# Patient Record
Sex: MALE | Race: WHITE | NOT HISPANIC OR LATINO | Employment: FULL TIME | URBAN - METROPOLITAN AREA
[De-identification: names, ages, dates, MRNs, and addresses within clinical notes are randomized per-mention and may not be internally consistent; named-entity substitution may affect disease eponyms.]

---

## 2023-04-12 ENCOUNTER — HOSPITAL ENCOUNTER (OUTPATIENT)
Dept: DATA CONVERSION | Facility: HOSPITAL | Age: 43
End: 2023-04-12
Attending: INTERNAL MEDICINE | Admitting: INTERNAL MEDICINE
Payer: COMMERCIAL

## 2023-04-12 DIAGNOSIS — Q39.8 OTHER CONGENITAL MALFORMATIONS OF ESOPHAGUS: ICD-10-CM

## 2023-04-12 DIAGNOSIS — R09.89 OTHER SPECIFIED SYMPTOMS AND SIGNS INVOLVING THE CIRCULATORY AND RESPIRATORY SYSTEMS: ICD-10-CM

## 2023-04-12 DIAGNOSIS — K22.89 OTHER SPECIFIED DISEASE OF ESOPHAGUS: ICD-10-CM

## 2023-04-12 DIAGNOSIS — K25.9 GASTRIC ULCER, UNSPECIFIED AS ACUTE OR CHRONIC, WITHOUT HEMORRHAGE OR PERFORATION: ICD-10-CM

## 2023-04-12 DIAGNOSIS — M06.9 RHEUMATOID ARTHRITIS, UNSPECIFIED (MULTI): ICD-10-CM

## 2023-04-12 DIAGNOSIS — Z87.891 PERSONAL HISTORY OF NICOTINE DEPENDENCE: ICD-10-CM

## 2023-04-12 DIAGNOSIS — F41.9 ANXIETY DISORDER, UNSPECIFIED: ICD-10-CM

## 2023-04-12 DIAGNOSIS — K21.9 GASTRO-ESOPHAGEAL REFLUX DISEASE WITHOUT ESOPHAGITIS: ICD-10-CM

## 2023-04-12 DIAGNOSIS — R12 HEARTBURN: ICD-10-CM

## 2023-04-12 DIAGNOSIS — K31.89 OTHER DISEASES OF STOMACH AND DUODENUM: ICD-10-CM

## 2023-04-17 LAB
COMPLETE PATHOLOGY REPORT: NORMAL
CONVERTED CLINICAL DIAGNOSIS-HISTORY: NORMAL
CONVERTED FINAL DIAGNOSIS: NORMAL
CONVERTED FINAL REPORT PDF LINK TO COPY AND PASTE: NORMAL
CONVERTED GROSS DESCRIPTION: NORMAL

## 2023-07-10 ENCOUNTER — OFFICE VISIT (OUTPATIENT)
Dept: PRIMARY CARE | Facility: CLINIC | Age: 43
End: 2023-07-10
Payer: COMMERCIAL

## 2023-07-10 ENCOUNTER — LAB (OUTPATIENT)
Dept: LAB | Facility: LAB | Age: 43
End: 2023-07-10
Payer: COMMERCIAL

## 2023-07-10 VITALS
DIASTOLIC BLOOD PRESSURE: 78 MMHG | SYSTOLIC BLOOD PRESSURE: 120 MMHG | WEIGHT: 219 LBS | HEART RATE: 70 BPM | BODY MASS INDEX: 31.42 KG/M2

## 2023-07-10 DIAGNOSIS — Z11.4 ENCOUNTER FOR SCREENING FOR HIV: ICD-10-CM

## 2023-07-10 DIAGNOSIS — Z11.59 NEED FOR HEPATITIS C SCREENING TEST: ICD-10-CM

## 2023-07-10 DIAGNOSIS — T78.40XA ALLERGIC REACTION, INITIAL ENCOUNTER: Primary | ICD-10-CM

## 2023-07-10 DIAGNOSIS — Z00.00 ANNUAL PHYSICAL EXAM: ICD-10-CM

## 2023-07-10 DIAGNOSIS — Z12.5 PROSTATE CANCER SCREENING: ICD-10-CM

## 2023-07-10 PROBLEM — M05.9 RHEUMATOID ARTHRITIS WITH RHEUMATOID FACTOR (MULTI): Status: ACTIVE | Noted: 2023-07-10

## 2023-07-10 PROBLEM — F41.9 ANXIETY: Status: ACTIVE | Noted: 2023-07-10

## 2023-07-10 PROBLEM — K21.9 ESOPHAGEAL REFLUX: Status: ACTIVE | Noted: 2023-07-10

## 2023-07-10 PROBLEM — R09.A2 GLOBUS SENSATION: Status: ACTIVE | Noted: 2023-07-10

## 2023-07-10 PROBLEM — E03.9 ACQUIRED HYPOTHYROIDISM: Status: ACTIVE | Noted: 2023-07-10

## 2023-07-10 PROBLEM — M25.50 ARTHRALGIA OF MULTIPLE SITES: Status: ACTIVE | Noted: 2023-07-10

## 2023-07-10 LAB
ALANINE AMINOTRANSFERASE (SGPT) (U/L) IN SER/PLAS: 31 U/L (ref 10–52)
ALBUMIN (G/DL) IN SER/PLAS: 4.3 G/DL (ref 3.4–5)
ALKALINE PHOSPHATASE (U/L) IN SER/PLAS: 42 U/L (ref 33–120)
ANION GAP IN SER/PLAS: 12 MMOL/L (ref 10–20)
ASPARTATE AMINOTRANSFERASE (SGOT) (U/L) IN SER/PLAS: 20 U/L (ref 9–39)
BASOPHILS (10*3/UL) IN BLOOD BY AUTOMATED COUNT: 0.03 X10E9/L (ref 0–0.1)
BASOPHILS/100 LEUKOCYTES IN BLOOD BY AUTOMATED COUNT: 0.5 % (ref 0–2)
BILIRUBIN TOTAL (MG/DL) IN SER/PLAS: 0.4 MG/DL (ref 0–1.2)
C REACTIVE PROTEIN (MG/L) IN SER/PLAS: 0.57 MG/DL
CALCIUM (MG/DL) IN SER/PLAS: 8.8 MG/DL (ref 8.6–10.3)
CARBON DIOXIDE, TOTAL (MMOL/L) IN SER/PLAS: 26 MMOL/L (ref 21–32)
CHLORIDE (MMOL/L) IN SER/PLAS: 104 MMOL/L (ref 98–107)
CHOLESTEROL (MG/DL) IN SER/PLAS: 182 MG/DL (ref 0–199)
CHOLESTEROL IN HDL (MG/DL) IN SER/PLAS: 53.4 MG/DL
CHOLESTEROL/HDL RATIO: 3.4
CREATININE (MG/DL) IN SER/PLAS: 0.9 MG/DL (ref 0.5–1.3)
EOSINOPHILS (10*3/UL) IN BLOOD BY AUTOMATED COUNT: 0.09 X10E9/L (ref 0–0.7)
EOSINOPHILS/100 LEUKOCYTES IN BLOOD BY AUTOMATED COUNT: 1.5 % (ref 0–6)
ERYTHROCYTE DISTRIBUTION WIDTH (RATIO) BY AUTOMATED COUNT: 12.9 % (ref 11.5–14.5)
ERYTHROCYTE MEAN CORPUSCULAR HEMOGLOBIN CONCENTRATION (G/DL) BY AUTOMATED: 32.2 G/DL (ref 32–36)
ERYTHROCYTE MEAN CORPUSCULAR VOLUME (FL) BY AUTOMATED COUNT: 98 FL (ref 80–100)
ERYTHROCYTES (10*6/UL) IN BLOOD BY AUTOMATED COUNT: 4.59 X10E12/L (ref 4.5–5.9)
GFR MALE: >90 ML/MIN/1.73M2
GLUCOSE (MG/DL) IN SER/PLAS: 84 MG/DL (ref 74–99)
HEMATOCRIT (%) IN BLOOD BY AUTOMATED COUNT: 45 % (ref 41–52)
HEMOGLOBIN (G/DL) IN BLOOD: 14.5 G/DL (ref 13.5–17.5)
IMMATURE GRANULOCYTES/100 LEUKOCYTES IN BLOOD BY AUTOMATED COUNT: 0.2 % (ref 0–0.9)
LDL: 102 MG/DL (ref 0–99)
LEUKOCYTES (10*3/UL) IN BLOOD BY AUTOMATED COUNT: 6.2 X10E9/L (ref 4.4–11.3)
LYMPHOCYTES (10*3/UL) IN BLOOD BY AUTOMATED COUNT: 1.84 X10E9/L (ref 1.2–4.8)
LYMPHOCYTES/100 LEUKOCYTES IN BLOOD BY AUTOMATED COUNT: 29.7 % (ref 13–44)
MONOCYTES (10*3/UL) IN BLOOD BY AUTOMATED COUNT: 0.79 X10E9/L (ref 0.1–1)
MONOCYTES/100 LEUKOCYTES IN BLOOD BY AUTOMATED COUNT: 12.7 % (ref 2–10)
NEUTROPHILS (10*3/UL) IN BLOOD BY AUTOMATED COUNT: 3.44 X10E9/L (ref 1.2–7.7)
NEUTROPHILS/100 LEUKOCYTES IN BLOOD BY AUTOMATED COUNT: 55.4 % (ref 40–80)
PLATELETS (10*3/UL) IN BLOOD AUTOMATED COUNT: 194 X10E9/L (ref 150–450)
POTASSIUM (MMOL/L) IN SER/PLAS: 3.9 MMOL/L (ref 3.5–5.3)
PROSTATE SPECIFIC AG (NG/ML) IN SER/PLAS: 0.19 NG/ML (ref 0–4)
PROTEIN TOTAL: 7.3 G/DL (ref 6.4–8.2)
SEDIMENTATION RATE, ERYTHROCYTE: 8 MM/H (ref 0–15)
SODIUM (MMOL/L) IN SER/PLAS: 138 MMOL/L (ref 136–145)
THYROTROPIN (MIU/L) IN SER/PLAS BY DETECTION LIMIT <= 0.05 MIU/L: 2.46 MIU/L (ref 0.44–3.98)
TRIGLYCERIDE (MG/DL) IN SER/PLAS: 132 MG/DL (ref 0–149)
UREA NITROGEN (MG/DL) IN SER/PLAS: 14 MG/DL (ref 6–23)
VLDL: 26 MG/DL (ref 0–40)

## 2023-07-10 PROCEDURE — 80053 COMPREHEN METABOLIC PANEL: CPT

## 2023-07-10 PROCEDURE — 99214 OFFICE O/P EST MOD 30 MIN: CPT | Performed by: FAMILY MEDICINE

## 2023-07-10 PROCEDURE — 85025 COMPLETE CBC W/AUTO DIFF WBC: CPT

## 2023-07-10 PROCEDURE — 86803 HEPATITIS C AB TEST: CPT

## 2023-07-10 PROCEDURE — 80061 LIPID PANEL: CPT

## 2023-07-10 PROCEDURE — 1036F TOBACCO NON-USER: CPT | Performed by: FAMILY MEDICINE

## 2023-07-10 PROCEDURE — 84443 ASSAY THYROID STIM HORMONE: CPT

## 2023-07-10 PROCEDURE — 36415 COLL VENOUS BLD VENIPUNCTURE: CPT

## 2023-07-10 PROCEDURE — 87389 HIV-1 AG W/HIV-1&-2 AB AG IA: CPT

## 2023-07-10 PROCEDURE — 84153 ASSAY OF PSA TOTAL: CPT

## 2023-07-10 RX ORDER — METHOTREXATE 2.5 MG/1
TABLET ORAL
COMMUNITY
End: 2023-10-20 | Stop reason: SDUPTHER

## 2023-07-10 RX ORDER — EPINEPHRINE 0.3 MG/.3ML
1 INJECTION SUBCUTANEOUS AS NEEDED
Qty: 1 EACH | Refills: 1 | Status: SHIPPED | OUTPATIENT
Start: 2023-07-10 | End: 2024-07-09

## 2023-07-10 RX ORDER — FOLIC ACID 1 MG/1
1 TABLET ORAL DAILY
COMMUNITY
End: 2023-10-25 | Stop reason: SDUPTHER

## 2023-07-10 RX ORDER — METHYLPREDNISOLONE 4 MG/1
TABLET ORAL
Qty: 21 TABLET | Refills: 0 | Status: SHIPPED | OUTPATIENT
Start: 2023-07-10 | End: 2023-07-17

## 2023-07-10 RX ORDER — OMEPRAZOLE 20 MG/1
1 CAPSULE, DELAYED RELEASE ORAL DAILY
COMMUNITY
End: 2023-10-25 | Stop reason: DRUGHIGH

## 2023-07-10 RX ORDER — ADALIMUMAB 40MG/0.4ML
KIT SUBCUTANEOUS
COMMUNITY
Start: 2023-04-19 | End: 2023-10-25 | Stop reason: WASHOUT

## 2023-07-10 RX ORDER — FLUOXETINE HYDROCHLORIDE 20 MG/1
1 CAPSULE ORAL DAILY
COMMUNITY
Start: 2020-11-16 | End: 2023-10-25 | Stop reason: WASHOUT

## 2023-07-10 RX ORDER — HYDROXYCHLOROQUINE SULFATE 200 MG/1
1 TABLET, FILM COATED ORAL
COMMUNITY
Start: 2017-03-17 | End: 2023-12-04

## 2023-07-10 ASSESSMENT — PATIENT HEALTH QUESTIONNAIRE - PHQ9
1. LITTLE INTEREST OR PLEASURE IN DOING THINGS: NOT AT ALL
2. FEELING DOWN, DEPRESSED OR HOPELESS: NOT AT ALL
SUM OF ALL RESPONSES TO PHQ9 QUESTIONS 1 AND 2: 0

## 2023-07-10 NOTE — ASSESSMENT & PLAN NOTE
Common side effects of Humira include skin rashes and the symptoms do correspond chronologically with the initiation of this medication  Before discontinuing this medication, I recommend that you follow-up with your rheumatologist first  You indicated you have an appointment with him in 2 weeks but you may want to call him sooner  I gave you the number for Dr. Carrasco, an allergy and immunologist, for formal allergy testing, please call and schedule an appointment with him at your earliest convenience  I have also ordered health maintenance labs as it has been a while since we last did this  Additionally, I sent prescriptions to your pharmacy for Medrol Dosepak and an EpiPen to have around just in case anaphylaxis occurs  If at anytime you begin to experience difficulty breathing, please call 911 or go to the nearest emergency department

## 2023-07-10 NOTE — PROGRESS NOTES
Subjective   Patient ID: Rhys Paul is a 43 y.o. male who presents for Allergic Reaction (Lips and tongue swell on and off).    HPI  2-month history of intermittent swelling of lips and tongue with rashes developing on his arms and legs  The swelling happened suddenly and will usually self resolve in a day or so  His tongue has gotten so swollen that has interfered with his speech but never to the point where he had difficulty breathing  The rashes start small and eventually get to the size of about a softball  Eventually they self resolve also but are warm and very itchy  Denies constitutional symptoms  Reports that his rheumatologist started him on Humira sometime in early June for his RA and this corresponds with onset of his symptoms    Review of Systems  All pertinent positive symptoms are included in the history of present illness.    All other systems have been reviewed and are negative and noncontributory to this patient's current ailments.    Current Outpatient Medications   Medication Instructions    EPINEPHrine (EPIPEN) 0.3 mg, injection, As needed, Call 911 after use.    FLUoxetine (PROzac) 20 mg capsule 1 capsule, oral, Daily    folic acid (Folvite) 1 mg tablet 1 tablet, oral, Daily    Humira,CF, Pen 40 mg/0.4 mL pen injector kit pen-injector subcutaneous    hydroxychloroquine (Plaquenil) 200 mg tablet 1 tablet, oral, 2 times daily with meals    methotrexate (Trexall) 2.5 mg tablet oral    methylPREDNISolone (Medrol Dospak) 4 mg tablets Take as directed on package.    omeprazole (PriLOSEC) 20 mg DR capsule 1 capsule, oral, Daily     No Known Allergies    Immunization History   Administered Date(s) Administered    Hep A, Adult 01/28/2016    Influenza, Unspecified 10/17/2014    Pfizer Gray Cap SARS-CoV-2 06/16/2022    Pfizer Purple Cap SARS-CoV-2 02/13/2021, 03/07/2021, 10/09/2021    Pfizer Sars-cov-2 Bivalent 30 mcg/0.3 mL 03/04/2023    Tdap 11/24/2021     Past Surgical History:   Procedure  Laterality Date    GALLBLADDER SURGERY  11/11/2013    Gallbladder Surgery    OTHER SURGICAL HISTORY  11/11/2013    Knee Arthroscopy With Medial And Lateral Meniscus Repair    TYMPANOSTOMY TUBE PLACEMENT  11/11/2013    Ear Pressure Equalization Tube, Insertion, Bilaterally     No family history on file.  Social History     Tobacco Use    Smoking status: Former     Types: Cigarettes    Smokeless tobacco: Never       Objective   Visit Vitals  /78   Pulse 70   Wt 99.3 kg (219 lb)   BMI 31.42 kg/m²   Smoking Status Former   BSA 2.21 m²       Physical Exam  CONSTITUTIONAL - well nourished, well developed, looks like stated age, in no acute distress, not ill-appearing, and not tired appearing  SKIN - multiple circumferential pink-colored lesions involving both arms and posterior thighs, lesions are slightly raised and warm to touch but do not appear erythematous or infectious  HEAD - no trauma, normocephalic  CHEST - clear to auscultation, no wheezing, no crackles and no rales, good effort  CARDIAC - regular rate and regular rhythm, no skipped beats, no murmur  EXTREMITIES - no edema, no deformities  NEUROLOGICAL - normal gait, normal balance, normal motor  PSYCHIATRIC - alert, pleasant and cordial, age-appropriate     Assessment/Plan   Problem List Items Addressed This Visit       Allergic reaction - Primary     Common side effects of Humira include skin rashes and the symptoms do correspond chronologically with the initiation of this medication  Before discontinuing this medication, I recommend that you follow-up with your rheumatologist first  You indicated you have an appointment with him in 2 weeks but you may want to call him sooner  I gave you the number for Dr. Carrasco, an allergy and immunologist, for formal allergy testing, please call and schedule an appointment with him at your earliest convenience  I have also ordered health maintenance labs as it has been a while since we last did this  Additionally,  I sent prescriptions to your pharmacy for Medrol Dosepak and an EpiPen to have around just in case anaphylaxis occurs  If at anytime you begin to experience difficulty breathing, please call 911 or go to the nearest emergency department         Relevant Medications    methylPREDNISolone (Medrol Dospak) 4 mg tablets    EPINEPHrine (Epipen) 0.3 mg/0.3 mL injection syringe     Other Visit Diagnoses       Annual physical exam        Relevant Orders    Lipid Panel    Prostate Specific Antigen    TSH with reflex to Free T4 if abnormal    Comprehensive Metabolic Panel    CBC and Auto Differential    HIV 1/2 Antigen/Antibody Screen with Reflex to Confirmation    Hepatitis C Antibody    Need for hepatitis C screening test        Relevant Orders    Hepatitis C Antibody    Prostate cancer screening        Relevant Orders    Prostate Specific Antigen    Encounter for screening for HIV        Relevant Orders    HIV 1/2 Antigen/Antibody Screen with Reflex to Confirmation

## 2023-07-11 LAB
HEPATITIS C VIRUS AB PRESENCE IN SERUM: NONREACTIVE
HIV 1/ 2 AG/AB SCREEN: NONREACTIVE

## 2023-07-13 LAB
NIL(NEG) CONTROL SPOT COUNT: NORMAL
PANEL A SPOT COUNT: 1
PANEL B SPOT COUNT: 0
POS CONTROL SPOT COUNT: NORMAL
T-SPOT. TB INTERPRETATION: NEGATIVE

## 2023-09-20 PROBLEM — R76.8 RHEUMATOID FACTOR POSITIVE: Status: ACTIVE | Noted: 2023-09-20

## 2023-09-20 RX ORDER — CETIRIZINE HYDROCHLORIDE 10 MG/1
10 TABLET ORAL
COMMUNITY
Start: 2017-04-28 | End: 2024-02-28 | Stop reason: ALTCHOICE

## 2023-09-20 RX ORDER — LORATADINE 10 MG/1
10 TABLET ORAL DAILY
COMMUNITY
End: 2024-02-28 | Stop reason: ALTCHOICE

## 2023-09-20 RX ORDER — IBUPROFEN 600 MG/1
TABLET ORAL
COMMUNITY
End: 2024-02-28 | Stop reason: ALTCHOICE

## 2023-09-20 RX ORDER — ESCITALOPRAM OXALATE 20 MG/1
20 TABLET ORAL DAILY
COMMUNITY
End: 2023-10-25 | Stop reason: WASHOUT

## 2023-09-20 RX ORDER — ALBUTEROL SULFATE 90 UG/1
2 AEROSOL, METERED RESPIRATORY (INHALATION) EVERY 4 HOURS PRN
COMMUNITY
Start: 2018-05-02 | End: 2023-10-25 | Stop reason: WASHOUT

## 2023-09-20 RX ORDER — BENZONATATE 100 MG/1
200 CAPSULE ORAL EVERY 8 HOURS PRN
COMMUNITY
Start: 2018-05-02 | End: 2023-10-25 | Stop reason: WASHOUT

## 2023-09-20 RX ORDER — OMEPRAZOLE 40 MG/1
40 CAPSULE, DELAYED RELEASE ORAL
COMMUNITY
End: 2023-10-25 | Stop reason: WASHOUT

## 2023-09-20 RX ORDER — TRIAMCINOLONE ACETONIDE 1 MG/G
1 CREAM TOPICAL 2 TIMES DAILY
COMMUNITY
Start: 2017-04-28 | End: 2023-10-25 | Stop reason: WASHOUT

## 2023-10-20 ENCOUNTER — OFFICE VISIT (OUTPATIENT)
Dept: RHEUMATOLOGY | Facility: CLINIC | Age: 43
End: 2023-10-20
Payer: COMMERCIAL

## 2023-10-20 VITALS — DIASTOLIC BLOOD PRESSURE: 80 MMHG | WEIGHT: 223 LBS | BODY MASS INDEX: 32 KG/M2 | SYSTOLIC BLOOD PRESSURE: 124 MMHG

## 2023-10-20 DIAGNOSIS — M05.79 RHEUMATOID ARTHRITIS INVOLVING MULTIPLE SITES WITH POSITIVE RHEUMATOID FACTOR (MULTI): Primary | ICD-10-CM

## 2023-10-20 PROCEDURE — 99213 OFFICE O/P EST LOW 20 MIN: CPT | Performed by: INTERNAL MEDICINE

## 2023-10-20 PROCEDURE — 1036F TOBACCO NON-USER: CPT | Performed by: INTERNAL MEDICINE

## 2023-10-20 RX ORDER — METHOTREXATE 2.5 MG/1
20 TABLET ORAL
Qty: 96 TABLET | Refills: 0 | Status: SHIPPED
Start: 2023-10-20 | End: 2024-02-26 | Stop reason: SDUPTHER

## 2023-10-20 NOTE — PROGRESS NOTES
"Subjective   Patient ID: Rhys Paul is a 43 y.o. male who presents for Follow-up.    HPI  43-year-old male with history of seropositive RA presented for follow-up.  Patient states he is doing well.  No joint pain, swelling or morning stiffness.  He states hives are better with antihistamines.  He is following with allergy and immunology.  No formal diagnosis was made however it was thought he may have idiopathic urticaria.    Last Humira injection was given in July.    Immunosuppression: HCQ/MTX.    Past immunosuppression:  1.  Sulfasalazine.  Mood changes.  2.  Humira.      Review of Systems   All other systems reviewed and are negative.    Objective .      2/9/2022     8:23 AM 12/21/2022     1:50 PM 2/6/2023     4:04 PM 4/19/2023     4:26 PM 7/10/2023    11:29 AM 7/19/2023     8:00 AM 10/20/2023     8:00 AM   Vitals   Systolic  122 132 121 120 123 124   Diastolic  81 86 84 78 83 80   Heart Rate   60  70     Temp   36.5 °C (97.7 °F)       Height (in)  1.778 m (5' 10\") 1.778 m (5' 10\") 1.778 m (5' 10\")  1.778 m (5' 10\")    Weight (lb) 206 206 211 214 219 216 223   BMI 29.56 kg/m2 29.56 kg/m2 30.28 kg/m2 30.71 kg/m2 31.42 kg/m2 30.99 kg/m2 32 kg/m2   BSA (m2) 2.15 m2 2.15 m2 2.17 m2 2.19 m2 2.21 m2 2.2 m2 2.23 m2   Visit Report     Report  Report      Physical Exam.  Gen. AAO x3, NAD.  HEENT: No pallor or icterus, PERRLA, EOMI. Parotid glands  not enlarged. No cervical lymphadenopathy .  Skin: No rashes.  Heart: S1, S2/ RRR.   Lungs: CTA B.  Abdomen: Soft, NT/ND, BS regular.  MSK: No swollen or tender joint.  Neuro: Sensation to touch intact.Strength 5/5 throughout.   Psych:Appropriate mood and behavior  EXT: No edema    Assessment/Plan     43-year-old male with history of seropositive RA presented for follow-up.    #1: Seropositive RA.  He is doing well on current regimen.  It is unlikely he has had hives secondary to Humira.    -Continue methotrexate and hydroxychloroquine.  -Labs.  -We may consider to resume " Humira if needed.    Follow-up in 3 months.     This note was partially generated using the Dragon Voice recognition system. There may be some incorrect wording, spelling and/or spelling errors or punctuation errors that were not corrected prior to committing the note to the medical record.    Patient Active Problem List   Diagnosis    Acquired hypothyroidism    Anxiety    Arthralgia of multiple sites    Esophageal reflux    Rheumatoid arthritis with rheumatoid factor (CMS/HCC)    Globus sensation    Allergic reaction    Rheumatoid factor positive      Social History     Tobacco Use    Smoking status: Former     Types: Cigarettes    Smokeless tobacco: Never   Substance Use Topics    Alcohol use: Not on file      Family History   Problem Relation Name Age of Onset    Rheum arthritis Brother        Past Surgical History:   Procedure Laterality Date    GALLBLADDER SURGERY  11/11/2013    Gallbladder Surgery    OTHER SURGICAL HISTORY  11/11/2013    Knee Arthroscopy With Medial And Lateral Meniscus Repair    TYMPANOSTOMY TUBE PLACEMENT  11/11/2013    Ear Pressure Equalization Tube, Insertion, Bilaterally      No Known Allergies   Current Outpatient Medications   Medication Instructions    albuterol 90 mcg/actuation inhaler 2 puffs, inhalation, Every 4 hours PRN    benzonatate (TESSALON) 200 mg, oral, Every 8 hours PRN    cetirizine (ZYRTEC) 10 mg, oral, Daily RT    EPINEPHrine (EPIPEN) 0.3 mg, injection, As needed, Call 911 after use.    escitalopram (LEXAPRO) 20 mg, oral, Daily    FLUoxetine (PROzac) 20 mg capsule 1 capsule, oral, Daily    folic acid (Folvite) 1 mg tablet 1 tablet, oral, Daily    Humira,CF, Pen 40 mg/0.4 mL pen injector kit pen-injector subcutaneous    hydroxychloroquine (Plaquenil) 200 mg tablet 1 tablet, oral, 2 times daily with meals    ibuprofen 600 mg tablet     loratadine (CLARITIN) 10 mg, oral, Daily, In the morning    methotrexate (TREXALL) 20 mg, oral, Weekly    multivitamin capsule multivitamin     omeprazole (PriLOSEC) 20 mg DR capsule 1 capsule, oral, Daily    omeprazole (PRILOSEC) 40 mg, oral, Daily RT    triamcinolone (Kenalog) 0.1 % cream 1 Application, Topical, 2 times daily

## 2023-10-25 ENCOUNTER — OFFICE VISIT (OUTPATIENT)
Dept: PRIMARY CARE | Facility: CLINIC | Age: 43
End: 2023-10-25
Payer: COMMERCIAL

## 2023-10-25 VITALS
DIASTOLIC BLOOD PRESSURE: 82 MMHG | HEART RATE: 70 BPM | BODY MASS INDEX: 32.88 KG/M2 | HEIGHT: 69 IN | SYSTOLIC BLOOD PRESSURE: 122 MMHG | WEIGHT: 222 LBS

## 2023-10-25 DIAGNOSIS — Z00.00 PHYSICAL EXAM, ANNUAL: Primary | ICD-10-CM

## 2023-10-25 DIAGNOSIS — L50.9 URTICARIA: ICD-10-CM

## 2023-10-25 DIAGNOSIS — M05.9 RHEUMATOID ARTHRITIS WITH POSITIVE RHEUMATOID FACTOR, INVOLVING UNSPECIFIED SITE (MULTI): ICD-10-CM

## 2023-10-25 PROBLEM — R09.A2 GLOBUS SENSATION: Status: RESOLVED | Noted: 2023-07-10 | Resolved: 2023-10-25

## 2023-10-25 PROBLEM — R76.8 RHEUMATOID FACTOR POSITIVE: Status: RESOLVED | Noted: 2023-09-20 | Resolved: 2023-10-25

## 2023-10-25 PROCEDURE — 99213 OFFICE O/P EST LOW 20 MIN: CPT | Performed by: FAMILY MEDICINE

## 2023-10-25 PROCEDURE — 1036F TOBACCO NON-USER: CPT | Performed by: FAMILY MEDICINE

## 2023-10-25 PROCEDURE — 93000 ELECTROCARDIOGRAM COMPLETE: CPT | Performed by: FAMILY MEDICINE

## 2023-10-25 PROCEDURE — 99396 PREV VISIT EST AGE 40-64: CPT | Performed by: FAMILY MEDICINE

## 2023-10-25 RX ORDER — FAMOTIDINE 20 MG/1
20 TABLET, FILM COATED ORAL 2 TIMES DAILY
COMMUNITY
End: 2024-02-28 | Stop reason: ALTCHOICE

## 2023-10-25 RX ORDER — OMEPRAZOLE 20 MG/1
20 CAPSULE, DELAYED RELEASE ORAL
Qty: 1 CAPSULE | Refills: 0 | Status: SHIPPED | OUTPATIENT
Start: 2023-10-25

## 2023-10-25 RX ORDER — FOLIC ACID 1 MG/1
1 TABLET ORAL DAILY
Qty: 90 TABLET | Refills: 3 | Status: SHIPPED | OUTPATIENT
Start: 2023-10-25 | End: 2024-02-28 | Stop reason: SDUPTHER

## 2023-10-25 RX ORDER — METHYLPREDNISOLONE 4 MG/1
TABLET ORAL
Qty: 21 TABLET | Refills: 0 | Status: SHIPPED | OUTPATIENT
Start: 2023-10-25

## 2023-10-25 ASSESSMENT — PATIENT HEALTH QUESTIONNAIRE - PHQ9
1. LITTLE INTEREST OR PLEASURE IN DOING THINGS: NOT AT ALL
SUM OF ALL RESPONSES TO PHQ9 QUESTIONS 1 AND 2: 0
2. FEELING DOWN, DEPRESSED OR HOPELESS: NOT AT ALL

## 2023-10-25 NOTE — PROGRESS NOTES
Subjective   Patient ID: Mohit Paul is a 43 y.o. male who presents for Annual Exam and Allergies.    HPI  1.  Physical exam.  Immunizations: Tdap 2021, flu vaccine updated before today's visit  Physical exam blood work done in the summer, looked excellent, those results are noted below  No significant medical concerns or complaints today with the exception of below    2.  Idiopathic urticaria.  For the past 5 months he has had hives at pressure points throughout his body  Currently they are around his wrists, forearms, and neck  States they are itchy and he has tried a multitude of anti-histamines after consulting Allergist, Dr. Carrasco, with whom he has another follow-up next week  Requesting a Medrol DosePak as this is to be the only thing that has provided relief in the past    3.  Rheumatoid arthritis.  Previously diagnosed with RA and following up with Rheumatology  Interested in considering a second opinion    Review of Systems  All pertinent positive symptoms are included in the history of present illness.    All other systems have been reviewed and are negative and noncontributory to this patient's current ailments.    Past Medical History:   Diagnosis Date    Gastro-esophageal reflux disease with esophagitis, without bleeding     Gastro-esophageal reflux disease with esophagitis    Hypothyroidism, unspecified 06/16/2017    Acquired hypothyroidism    Rheumatoid arthritis with rheumatoid factor (CMS/[facility]) 07/10/2023     Past Surgical History:   Procedure Laterality Date    GALLBLADDER SURGERY  11/11/2013    Gallbladder Surgery    KNEE ARTHROSCOPY W/ MENISCAL REPAIR Right     TYMPANOSTOMY TUBE PLACEMENT  11/11/2013    Ear Pressure Equalization Tube, Insertion, Bilaterally     Social History     Tobacco Use    Smoking status: Former     Types: Cigarettes    Smokeless tobacco: Never   Substance Use Topics    Alcohol use: Yes    Drug use: Never     Family History   Problem Relation Name Age of Onset    Other  "(Rheumatoid arthritis) Brother       No Known Allergies  Immunization History   Administered Date(s) Administered    Flu vaccine (IIV4), preservative free *Check age/dose* 09/25/2023    Hepatitis A vaccine, age 19 years and greater (HAVRIX) 01/28/2016    Influenza, Unspecified 10/04/2013, 10/03/2014, 10/17/2014, 01/28/2016, 09/02/2016, 09/15/2017, 10/01/2020, 11/07/2020, 09/30/2021, 11/19/2022, 09/22/2023    Pfizer COVID-19 vaccine, bivalent, age 12 years and older (30 mcg/0.3 mL) 03/04/2023    Pfizer Gray Cap SARS-CoV-2 06/16/2022    Pfizer Purple Cap SARS-CoV-2 02/13/2021, 03/07/2021, 10/09/2021    Tdap vaccine, age 7 year and older (BOOSTRIX) 07/07/2010, 01/28/2016, 11/24/2021     Current Outpatient Medications   Medication Instructions    cetirizine (ZYRTEC) 10 mg, oral, Daily RT    EPINEPHrine (EPIPEN) 0.3 mg, injection, As needed, Call 911 after use.    famotidine (PEPCID) 20 mg, oral, 2 times daily    folic acid (FOLVITE) 1 mg, oral, Daily    hydroxychloroquine (Plaquenil) 200 mg tablet 1 tablet, oral, 2 times daily with meals    ibuprofen 600 mg tablet     loratadine (CLARITIN) 10 mg, oral, Daily, In the morning    methotrexate (TREXALL) 20 mg, oral, Weekly    methylPREDNISolone (Medrol, Luis Alberto,) 4 mg tablets Follow schedule on package instructions    multivitamin capsule multivitamin    omeprazole (PRILOSEC) 20 mg, oral, 2 times daily before meals     Objective   Visit Vitals  /82   Pulse 70   Ht 1.753 m (5' 9\")   Wt 101 kg (222 lb)   BMI 32.78 kg/m²   Smoking Status Former   BSA 2.22 m²     No visits with results within 1 Month(s) from this visit.   Latest known visit with results is:   Lab on 07/10/2023   Component Date Value    Cholesterol 07/10/2023 182     HDL 07/10/2023 53.4     Cholesterol/HDL Ratio 07/10/2023 3.4     LDL 07/10/2023 102 (H)     VLDL 07/10/2023 26     Triglycerides 07/10/2023 132     PSA 07/10/2023 0.19     TSH 07/10/2023 2.46     Glucose 07/10/2023 84     Sodium 07/10/2023 138     " Potassium 07/10/2023 3.9     Chloride 07/10/2023 104     Bicarbonate 07/10/2023 26     Anion Gap 07/10/2023 12     Urea Nitrogen 07/10/2023 14     Creatinine 07/10/2023 0.90     GFR MALE 07/10/2023 >90     Calcium 07/10/2023 8.8     Albumin 07/10/2023 4.3     Alkaline Phosphatase 07/10/2023 42     Total Protein 07/10/2023 7.3     AST 07/10/2023 20     Total Bilirubin 07/10/2023 0.4     ALT (SGPT) 07/10/2023 31     WBC 07/10/2023 6.2     RBC 07/10/2023 4.59     Hemoglobin 07/10/2023 14.5     Hematocrit 07/10/2023 45.0     MCV 07/10/2023 98     MCHC 07/10/2023 32.2     Platelets 07/10/2023 194     RDW 07/10/2023 12.9     Neutrophils % 07/10/2023 55.4     Immature Granulocytes %,* 07/10/2023 0.2     Lymphocytes % 07/10/2023 29.7     Monocytes % 07/10/2023 12.7     Eosinophils % 07/10/2023 1.5     Basophils % 07/10/2023 0.5     Neutrophils Absolute 07/10/2023 3.44     Lymphocytes Absolute 07/10/2023 1.84     Monocytes Absolute 07/10/2023 0.79     Eosinophils Absolute 07/10/2023 0.09     Basophils Absolute 07/10/2023 0.03     HIV 1 and 2 Screen 07/10/2023 NONREACTIVE     Hepatitis C Ab 07/10/2023 NONREACTIVE      Physical Exam  CONSTITUTIONAL - well nourished, well developed, looks like stated age, in no acute distress, not ill-appearing, and not tired appearing  SKIN - normal skin color and pigmentation, urticaria bilateral wrists, forearms and neck  HEAD - no trauma, normocephalic  EYES - pupils are equal and reactive to light  ENT - TM's intact, no injection, no signs of infection, uvula midline, normal tongue movement and throat normal  NECK - supple without rigidity  CHEST - clear to auscultation, no wheezing, no crackles and no rales, good effort  CARDIAC - regular rate and regular rhythm, no skipped beats, no murmur  ABDOMEN - no organomegaly, soft, nontender, nondistended  EXTREMITIES - no obvious or evident edema, no obvious or evident deformities  NEUROLOGICAL - normal gait, normal balance, normal  motor  PSYCHIATRIC - alert, pleasant and cordial, age-appropriate  IMMUNOLOGIC - no cervical lymphadenopathy    Assessment/Plan   Problem List Items Addressed This Visit       Rheumatoid arthritis with rheumatoid factor (CMS/HCC)    Relevant Medications    folic acid (Folvite) 1 mg tablet    Physical exam, annual - Primary     Complete history and physical examination was performed  EKG reveals normal sinus rhythm without acute changes  Blood work was done earlier this summer, noted to be all normal         Relevant Orders    ECG 12 lead (Clinic Performed) (Completed)    Urticaria    Relevant Medications    methylPREDNISolone (Medrol, Luis Alberto,) 4 mg tablets

## 2023-10-25 NOTE — ASSESSMENT & PLAN NOTE
Complete history and physical examination was performed  EKG reveals normal sinus rhythm without acute changes  Blood work was done earlier this summer, noted to be all normal

## 2023-12-02 DIAGNOSIS — R76.8 RHEUMATOID FACTOR POSITIVE: Primary | ICD-10-CM

## 2023-12-04 RX ORDER — HYDROXYCHLOROQUINE SULFATE 200 MG/1
TABLET, FILM COATED ORAL
Qty: 180 TABLET | Refills: 0 | Status: SHIPPED | OUTPATIENT
Start: 2023-12-04 | End: 2024-02-28 | Stop reason: SDUPTHER

## 2024-01-23 ENCOUNTER — APPOINTMENT (OUTPATIENT)
Dept: RHEUMATOLOGY | Facility: CLINIC | Age: 44
End: 2024-01-23
Payer: COMMERCIAL

## 2024-02-26 DIAGNOSIS — M05.79 RHEUMATOID ARTHRITIS INVOLVING MULTIPLE SITES WITH POSITIVE RHEUMATOID FACTOR (MULTI): ICD-10-CM

## 2024-02-26 RX ORDER — METHOTREXATE 2.5 MG/1
20 TABLET ORAL
Qty: 16 TABLET | Refills: 0 | Status: SHIPPED | OUTPATIENT
Start: 2024-02-26 | End: 2024-02-28 | Stop reason: SDUPTHER

## 2024-02-28 ENCOUNTER — OFFICE VISIT (OUTPATIENT)
Dept: RHEUMATOLOGY | Facility: CLINIC | Age: 44
End: 2024-02-28
Payer: COMMERCIAL

## 2024-02-28 ENCOUNTER — LAB (OUTPATIENT)
Dept: LAB | Facility: LAB | Age: 44
End: 2024-02-28
Payer: COMMERCIAL

## 2024-02-28 VITALS
WEIGHT: 226 LBS | SYSTOLIC BLOOD PRESSURE: 130 MMHG | HEIGHT: 69 IN | HEART RATE: 88 BPM | BODY MASS INDEX: 33.47 KG/M2 | DIASTOLIC BLOOD PRESSURE: 81 MMHG

## 2024-02-28 DIAGNOSIS — R76.8 RHEUMATOID FACTOR POSITIVE: ICD-10-CM

## 2024-02-28 DIAGNOSIS — M05.9 RHEUMATOID ARTHRITIS WITH POSITIVE RHEUMATOID FACTOR, INVOLVING UNSPECIFIED SITE (MULTI): ICD-10-CM

## 2024-02-28 DIAGNOSIS — M05.9 RHEUMATOID ARTHRITIS WITH POSITIVE RHEUMATOID FACTOR, INVOLVING UNSPECIFIED SITE (MULTI): Primary | ICD-10-CM

## 2024-02-28 DIAGNOSIS — M05.79 RHEUMATOID ARTHRITIS INVOLVING MULTIPLE SITES WITH POSITIVE RHEUMATOID FACTOR (MULTI): ICD-10-CM

## 2024-02-28 LAB
ALBUMIN SERPL BCP-MCNC: 4.4 G/DL (ref 3.4–5)
ALP SERPL-CCNC: 46 U/L (ref 33–120)
ALT SERPL W P-5'-P-CCNC: 21 U/L (ref 10–52)
ANION GAP SERPL CALC-SCNC: 13 MMOL/L (ref 10–20)
AST SERPL W P-5'-P-CCNC: 18 U/L (ref 9–39)
BILIRUB DIRECT SERPL-MCNC: 0.1 MG/DL (ref 0–0.3)
BILIRUB SERPL-MCNC: 0.4 MG/DL (ref 0–1.2)
BUN SERPL-MCNC: 12 MG/DL (ref 6–23)
CALCIUM SERPL-MCNC: 9.3 MG/DL (ref 8.6–10.6)
CHLORIDE SERPL-SCNC: 102 MMOL/L (ref 98–107)
CO2 SERPL-SCNC: 29 MMOL/L (ref 21–32)
CREAT SERPL-MCNC: 0.87 MG/DL (ref 0.5–1.3)
CRP SERPL-MCNC: 0.34 MG/DL
EGFRCR SERPLBLD CKD-EPI 2021: >90 ML/MIN/1.73M*2
ERYTHROCYTE [DISTWIDTH] IN BLOOD BY AUTOMATED COUNT: 11.9 % (ref 11.5–14.5)
ERYTHROCYTE [SEDIMENTATION RATE] IN BLOOD BY WESTERGREN METHOD: 6 MM/H (ref 0–15)
GLUCOSE SERPL-MCNC: 98 MG/DL (ref 74–99)
HCT VFR BLD AUTO: 43.9 % (ref 41–52)
HGB BLD-MCNC: 14.5 G/DL (ref 13.5–17.5)
MCH RBC QN AUTO: 31.4 PG (ref 26–34)
MCHC RBC AUTO-ENTMCNC: 33 G/DL (ref 32–36)
MCV RBC AUTO: 95 FL (ref 80–100)
NRBC BLD-RTO: 0 /100 WBCS (ref 0–0)
PLATELET # BLD AUTO: 193 X10*3/UL (ref 150–450)
POTASSIUM SERPL-SCNC: 3.8 MMOL/L (ref 3.5–5.3)
PROT SERPL-MCNC: 7.4 G/DL (ref 6.4–8.2)
RBC # BLD AUTO: 4.62 X10*6/UL (ref 4.5–5.9)
SODIUM SERPL-SCNC: 140 MMOL/L (ref 136–145)
WBC # BLD AUTO: 7.2 X10*3/UL (ref 4.4–11.3)

## 2024-02-28 PROCEDURE — 99213 OFFICE O/P EST LOW 20 MIN: CPT | Performed by: INTERNAL MEDICINE

## 2024-02-28 PROCEDURE — 36415 COLL VENOUS BLD VENIPUNCTURE: CPT

## 2024-02-28 PROCEDURE — 86140 C-REACTIVE PROTEIN: CPT

## 2024-02-28 PROCEDURE — 85027 COMPLETE CBC AUTOMATED: CPT

## 2024-02-28 PROCEDURE — 85652 RBC SED RATE AUTOMATED: CPT

## 2024-02-28 PROCEDURE — 1036F TOBACCO NON-USER: CPT | Performed by: INTERNAL MEDICINE

## 2024-02-28 PROCEDURE — 82248 BILIRUBIN DIRECT: CPT

## 2024-02-28 PROCEDURE — 80053 COMPREHEN METABOLIC PANEL: CPT

## 2024-02-28 RX ORDER — METHOTREXATE 2.5 MG/1
20 TABLET ORAL
Qty: 96 TABLET | Refills: 1 | Status: SHIPPED | OUTPATIENT
Start: 2024-02-28 | End: 2024-05-28

## 2024-02-28 RX ORDER — METHOTREXATE 2.5 MG/1
20 TABLET ORAL
Qty: 96 TABLET | Refills: 1 | Status: SHIPPED | OUTPATIENT
Start: 2024-02-28 | End: 2024-02-28 | Stop reason: SDUPTHER

## 2024-02-28 RX ORDER — HYDROXYCHLOROQUINE SULFATE 200 MG/1
200 TABLET, FILM COATED ORAL 2 TIMES DAILY
Qty: 180 TABLET | Refills: 0 | Status: SHIPPED | OUTPATIENT
Start: 2024-02-28 | End: 2024-05-28

## 2024-02-28 RX ORDER — FOLIC ACID 1 MG/1
1 TABLET ORAL DAILY
Qty: 90 TABLET | Refills: 3 | Status: SHIPPED | OUTPATIENT
Start: 2024-02-28 | End: 2025-02-27

## 2024-02-28 NOTE — PATIENT INSTRUCTIONS
Continue methotrexate and hydroxychloroquine as prescribed.  Schedule annual eye exam.  Call me if any question.  Follow-up in 4 months.

## 2024-02-28 NOTE — PROGRESS NOTES
"Subjective . Rhys Paul \"Mohit\" is a 44 y.o. male who presents for Med Refill.    Med Refill    . 44-year-old male with history of seropositive RA presented for follow-up.     He stated that he is feeling fine most of the time however once in a while notes mild fullness in the shoulders after certain activities.  Hives improved with Xolair.    Impression:Immunosuppression: HCQ/MTX.     Past immunosuppression:  1.  Sulfasalazine.  Mood changes.  2.  Humira.    Review of Systems   All other systems reviewed and are negative.    Objective     Blood pressure 130/81, pulse 88, height 1.753 m (5' 9\"), weight 103 kg (226 lb).    Physical Exam.  Gen. AAO x3, NAD.  HEENT: No pallor or icterus, PERRLA, EOMI. Parotid glands  not enlarged. No cervical lymphadenopathy .  Skin: No rashes.  Heart: S1, S2/ RRR. No murmurs or gallops.  Lungs: CTA B.  Abdomen: Soft, NT/ND.  MSK: No.swelling or tenderness of the  upper or lower extremity joints with full ROM, Neck,spine and Yunior SI with out tenderness.  Neuro:  Sensation to touch intact.Strength 5/5 throughout.   Psych:Appropriate mood and behavior  EXT: No edema    Assessment/Plan   . 44-year-old male with history of seropositive RA presented for follow-up.     1: Seropositive RA.  He is doing well.  No swollen or tender joint.  -Continue methotrexate 20 mg/week.  -Continue hydroxychloroquine twice a day.  Annual eye exam discussed.  -Labs.    Follow-up in 4 months.     This note was partially generated using the Dragon Voice recognition system. There may be some incorrect wording, spelling and/or spelling errors or punctuation errors that were not corrected prior to committing the note to the medical record.    Problem List Items Addressed This Visit       Rheumatoid arthritis with rheumatoid factor (CMS/HCC) - Primary    Relevant Medications    methotrexate (Trexall) 2.5 mg tablet    folic acid (Folvite) 1 mg tablet    Other Relevant Orders    CBC    Comprehensive Metabolic " Panel    C-reactive protein    Sedimentation Rate     Other Visit Diagnoses       Rheumatoid factor positive        Relevant Medications    hydroxychloroquine (Plaquenil) 200 mg tablet               Active Ambulatory Problems     Diagnosis Date Noted    Acquired hypothyroidism 07/10/2023    Anxiety 07/10/2023    Arthralgia of multiple sites 07/10/2023    Esophageal reflux 07/10/2023    Rheumatoid arthritis with rheumatoid factor (CMS/HCC) 07/10/2023    Allergic reaction 07/10/2023    Physical exam, annual 10/25/2023    Urticaria 10/25/2023     Resolved Ambulatory Problems     Diagnosis Date Noted    Globus sensation 07/10/2023    Rheumatoid factor positive 09/20/2023     Past Medical History:   Diagnosis Date    Gastro-esophageal reflux disease with esophagitis, without bleeding     Hypothyroidism, unspecified 06/16/2017       Family History   Problem Relation Name Age of Onset    Other (Rheumatoid arthritis) Brother         Past Surgical History:   Procedure Laterality Date    GALLBLADDER SURGERY  11/11/2013    Gallbladder Surgery    KNEE ARTHROSCOPY W/ MENISCAL REPAIR Right     TYMPANOSTOMY TUBE PLACEMENT  11/11/2013    Ear Pressure Equalization Tube, Insertion, Bilaterally       Social History     Tobacco Use   Smoking Status Former    Types: Cigarettes   Smokeless Tobacco Never       Allergies  Patient has no known allergies.    Current Meds  Current Outpatient Medications   Medication Instructions    EPINEPHrine (EPIPEN) 0.3 mg, injection, As needed, Call 911 after use.    folic acid (FOLVITE) 1 mg, oral, Daily    hydroxychloroquine (PLAQUENIL) 200 mg, oral, 2 times daily    methotrexate (TREXALL) 20 mg, oral, Weekly    methylPREDNISolone (Medrol, Luis Alberto,) 4 mg tablets Follow schedule on package instructions    multivitamin capsule multivitamin    omalizumab (XOLAIR SUBQ) subcutaneous    omeprazole (PRILOSEC) 20 mg, oral, 2 times daily before meals        Lab Results   Component Value Date    SEDRATE 8 07/10/2023     CRP 0.57 07/10/2023        Mat Dale MD

## 2024-06-24 ENCOUNTER — APPOINTMENT (OUTPATIENT)
Dept: RHEUMATOLOGY | Facility: CLINIC | Age: 44
End: 2024-06-24
Payer: COMMERCIAL

## 2024-09-17 ENCOUNTER — OFFICE VISIT (OUTPATIENT)
Dept: RHEUMATOLOGY | Facility: CLINIC | Age: 44
End: 2024-09-17
Payer: COMMERCIAL

## 2024-09-17 ENCOUNTER — LAB (OUTPATIENT)
Dept: LAB | Facility: LAB | Age: 44
End: 2024-09-17
Payer: COMMERCIAL

## 2024-09-17 VITALS
WEIGHT: 215 LBS | DIASTOLIC BLOOD PRESSURE: 74 MMHG | SYSTOLIC BLOOD PRESSURE: 120 MMHG | OXYGEN SATURATION: 96 % | HEIGHT: 69 IN | HEART RATE: 64 BPM | BODY MASS INDEX: 31.84 KG/M2

## 2024-09-17 DIAGNOSIS — M05.9 RHEUMATOID ARTHRITIS WITH POSITIVE RHEUMATOID FACTOR, INVOLVING UNSPECIFIED SITE (MULTI): ICD-10-CM

## 2024-09-17 DIAGNOSIS — R76.8 RHEUMATOID FACTOR POSITIVE: ICD-10-CM

## 2024-09-17 DIAGNOSIS — M05.9 RHEUMATOID ARTHRITIS WITH POSITIVE RHEUMATOID FACTOR, INVOLVING UNSPECIFIED SITE (MULTI): Primary | ICD-10-CM

## 2024-09-17 DIAGNOSIS — M05.79 RHEUMATOID ARTHRITIS INVOLVING MULTIPLE SITES WITH POSITIVE RHEUMATOID FACTOR (MULTI): ICD-10-CM

## 2024-09-17 LAB
ALBUMIN SERPL BCP-MCNC: 4.3 G/DL (ref 3.4–5)
ALP SERPL-CCNC: 51 U/L (ref 33–120)
ALT SERPL W P-5'-P-CCNC: 19 U/L (ref 10–52)
ANION GAP SERPL CALC-SCNC: 12 MMOL/L (ref 10–20)
AST SERPL W P-5'-P-CCNC: 17 U/L (ref 9–39)
BILIRUB SERPL-MCNC: 0.5 MG/DL (ref 0–1.2)
BUN SERPL-MCNC: 14 MG/DL (ref 6–23)
CALCIUM SERPL-MCNC: 9.3 MG/DL (ref 8.6–10.6)
CHLORIDE SERPL-SCNC: 102 MMOL/L (ref 98–107)
CO2 SERPL-SCNC: 29 MMOL/L (ref 21–32)
CREAT SERPL-MCNC: 1.08 MG/DL (ref 0.5–1.3)
CRP SERPL-MCNC: 0.83 MG/DL
EGFRCR SERPLBLD CKD-EPI 2021: 87 ML/MIN/1.73M*2
ERYTHROCYTE [DISTWIDTH] IN BLOOD BY AUTOMATED COUNT: 12.2 % (ref 11.5–14.5)
ERYTHROCYTE [SEDIMENTATION RATE] IN BLOOD BY WESTERGREN METHOD: 8 MM/H (ref 0–15)
GLUCOSE SERPL-MCNC: 77 MG/DL (ref 74–99)
HCT VFR BLD AUTO: 47.3 % (ref 41–52)
HGB BLD-MCNC: 15.1 G/DL (ref 13.5–17.5)
MCH RBC QN AUTO: 30.3 PG (ref 26–34)
MCHC RBC AUTO-ENTMCNC: 31.9 G/DL (ref 32–36)
MCV RBC AUTO: 95 FL (ref 80–100)
NRBC BLD-RTO: 0 /100 WBCS (ref 0–0)
PLATELET # BLD AUTO: 209 X10*3/UL (ref 150–450)
POTASSIUM SERPL-SCNC: 4.2 MMOL/L (ref 3.5–5.3)
PROT SERPL-MCNC: 7.4 G/DL (ref 6.4–8.2)
RBC # BLD AUTO: 4.98 X10*6/UL (ref 4.5–5.9)
SODIUM SERPL-SCNC: 139 MMOL/L (ref 136–145)
WBC # BLD AUTO: 7.4 X10*3/UL (ref 4.4–11.3)

## 2024-09-17 PROCEDURE — 86140 C-REACTIVE PROTEIN: CPT

## 2024-09-17 PROCEDURE — 80053 COMPREHEN METABOLIC PANEL: CPT

## 2024-09-17 PROCEDURE — 86481 TB AG RESPONSE T-CELL SUSP: CPT

## 2024-09-17 PROCEDURE — 85652 RBC SED RATE AUTOMATED: CPT

## 2024-09-17 PROCEDURE — 1036F TOBACCO NON-USER: CPT | Performed by: INTERNAL MEDICINE

## 2024-09-17 PROCEDURE — 85027 COMPLETE CBC AUTOMATED: CPT

## 2024-09-17 PROCEDURE — 36415 COLL VENOUS BLD VENIPUNCTURE: CPT

## 2024-09-17 PROCEDURE — 99213 OFFICE O/P EST LOW 20 MIN: CPT | Performed by: INTERNAL MEDICINE

## 2024-09-17 PROCEDURE — 3008F BODY MASS INDEX DOCD: CPT | Performed by: INTERNAL MEDICINE

## 2024-09-17 RX ORDER — METHOTREXATE 2.5 MG/1
20 TABLET ORAL
Qty: 96 TABLET | Refills: 1 | Status: SHIPPED | OUTPATIENT
Start: 2024-09-22 | End: 2024-12-21

## 2024-09-17 RX ORDER — HYDROXYCHLOROQUINE SULFATE 200 MG/1
200 TABLET, FILM COATED ORAL 2 TIMES DAILY
Qty: 180 TABLET | Refills: 0 | Status: SHIPPED | OUTPATIENT
Start: 2024-09-17 | End: 2024-12-16

## 2024-09-17 RX ORDER — FOLIC ACID 1 MG/1
1 TABLET ORAL DAILY
Qty: 90 TABLET | Refills: 3 | Status: SHIPPED | OUTPATIENT
Start: 2024-09-17 | End: 2025-09-17

## 2024-09-17 NOTE — PROGRESS NOTES
"Subjective . Rhys Paul \"Mohit\" is a 44 y.o. male who presents for Follow-up ([facility]- Medication refills).    HPI.  44-year-old male with history of seropositive RA presented for follow-up.     He reports for the past couple months he has been having stiffness in the hands, elbows, shoulders and left knee.  He has not noticed swelling of the joints however has small Nobbs at the DIP joints of some of the fingers.  He has been taking ibuprofen.    Hives resolved with Xolair.  He is following with allergy immunology.  He was told that he has allergic contact dermatitis.    Impression:Immunosuppression: HCQ/MTX.     Past immunosuppression:  1.  Sulfasalazine.  Mood changes.  2.  Humira.    Review of Systems   All other systems reviewed and are negative.    Objective     Blood pressure 120/74, pulse 64, height 1.753 m (5' 9\"), weight 97.5 kg (215 lb), SpO2 96%.    Physical Exam.  Gen. AAO x3, NAD.  HEENT: No pallor or icterus, PERRLA, EOMI.  No cervical lymphadenopathy .  Skin: No rashes.  Heart: S1, S2/ RRR.   Lungs: CTA B.  Abdomen: Soft, NT/ND.  MSK: No.swelling or tenderness of the MCP and PIP joints.  Bilateral [place] nodes.  Bilateral wrist, elbows without swelling and tenderness.  Bilateral shoulders with good range of motion.  Neck, spine and SI joint without tenderness.  Bilateral Edwin's negative.  Bilateral knee without erythema, warmth or effusion.  Knee range of motion full.  Ankle and MTPs without swelling and tenderness.   Neuro: Sensation to touch intact.Strength 5/5 throughout.   Psych:Appropriate mood and behavior  EXT: No edema    Assessment/Plan .  44-year-old male with history of seropositive RA presented for follow-up.     #1: Seropositive rheumatoid arthritis.  -Begin Humira injection every other week.  -Continue methotrexate 20 mg/week.  -Continue hydroxychloroquine twice a day.  -Labs.    Follow-up in 3 months.     This note was partially generated using the Dragon Voice recognition system. " There may be some incorrect wording, spelling and/or spelling errors or punctuation errors that were not corrected prior to committing the note to the medical record.    Problem List Items Addressed This Visit       Rheumatoid arthritis with rheumatoid factor (Multi) - Primary    Relevant Medications    adalimumab (Humira Pen) 40 mg/0.4 mL pen injector kit pen-injector    methotrexate (Trexall) 2.5 mg tablet (Start on 9/22/2024)    folic acid (Folvite) 1 mg tablet    Other Relevant Orders    C-Reactive Protein    T-Spot TB    Sedimentation Rate    Comprehensive Metabolic Panel    CBC     Other Visit Diagnoses       Rheumatoid factor positive        Relevant Medications    hydroxychloroquine (Plaquenil) 200 mg tablet                 Active Ambulatory Problems     Diagnosis Date Noted    Acquired hypothyroidism 07/10/2023    Anxiety 07/10/2023    Arthralgia of multiple sites 07/10/2023    Esophageal reflux 07/10/2023    Rheumatoid arthritis with rheumatoid factor (Multi) 07/10/2023    Allergic reaction 07/10/2023    Physical exam, annual 10/25/2023    Urticaria 10/25/2023     Resolved Ambulatory Problems     Diagnosis Date Noted    Globus sensation 07/10/2023    Rheumatoid factor positive 09/20/2023     Past Medical History:   Diagnosis Date    Gastro-esophageal reflux disease with esophagitis, without bleeding     Hypothyroidism, unspecified 06/16/2017       Family History   Problem Relation Name Age of Onset    Other (Rheumatoid arthritis) Brother         Past Surgical History:   Procedure Laterality Date    GALLBLADDER SURGERY  11/11/2013    Gallbladder Surgery    KNEE ARTHROSCOPY W/ MENISCAL REPAIR Right     TYMPANOSTOMY TUBE PLACEMENT  11/11/2013    Ear Pressure Equalization Tube, Insertion, Bilaterally       Social History     Tobacco Use   Smoking Status Former    Types: Cigarettes   Smokeless Tobacco Never       Allergies  Patient has no known allergies.    Current Meds  Current Outpatient Medications    Medication Instructions    adalimumab (HUMIRA PEN) 40 mg, subcutaneous, Every 14 days    EPINEPHrine (EPIPEN) 0.3 mg, injection, As needed, Call 911 after use.    folic acid (FOLVITE) 1 mg, oral, Daily    hydroxychloroquine (PLAQUENIL) 200 mg, oral, 2 times daily    [START ON 9/22/2024] methotrexate (TREXALL) 20 mg, oral, Once Weekly    methylPREDNISolone (Medrol, Luis Alberto,) 4 mg tablets Follow schedule on package instructions    multivitamin capsule multivitamin    omalizumab (XOLAIR SUBQ) subcutaneous    omeprazole (PRILOSEC) 20 mg, oral, 2 times daily before meals        Lab Results   Component Value Date    SEDRATE 6 02/28/2024    CRP 0.34 02/28/2024             Mat Dale MD

## 2024-09-19 LAB
NIL(NEG) CONTROL SPOT COUNT: NORMAL
PANEL A SPOT COUNT: 0
PANEL B SPOT COUNT: 0
POS CONTROL SPOT COUNT: NORMAL
T-SPOT. TB INTERPRETATION: NEGATIVE

## 2024-09-25 ENCOUNTER — TELEPHONE (OUTPATIENT)
Dept: RHEUMATOLOGY | Facility: CLINIC | Age: 44
End: 2024-09-25

## 2024-09-25 DIAGNOSIS — L50.9 URTICARIA: ICD-10-CM

## 2024-09-25 RX ORDER — METHYLPREDNISOLONE 4 MG/1
TABLET ORAL
Qty: 21 TABLET | Refills: 0 | Status: SHIPPED | OUTPATIENT
Start: 2024-09-25

## 2024-11-03 ENCOUNTER — OFFICE VISIT (OUTPATIENT)
Dept: URGENT CARE | Age: 44
End: 2024-11-03
Payer: COMMERCIAL

## 2024-11-03 VITALS
DIASTOLIC BLOOD PRESSURE: 79 MMHG | HEART RATE: 60 BPM | HEIGHT: 69 IN | WEIGHT: 200 LBS | OXYGEN SATURATION: 97 % | SYSTOLIC BLOOD PRESSURE: 116 MMHG | BODY MASS INDEX: 29.62 KG/M2 | RESPIRATION RATE: 18 BRPM

## 2024-11-03 DIAGNOSIS — S60.551A SPLINTER OF HAND WITHOUT MAJOR OPEN WOUND OR INFECTION, RIGHT, INITIAL ENCOUNTER: Primary | ICD-10-CM

## 2024-11-03 RX ORDER — DOXYCYCLINE 100 MG/1
100 CAPSULE ORAL 2 TIMES DAILY
Qty: 14 CAPSULE | Refills: 0 | Status: SHIPPED | OUTPATIENT
Start: 2024-11-03 | End: 2024-11-10

## 2024-11-03 RX ORDER — MUPIROCIN 20 MG/G
OINTMENT TOPICAL 3 TIMES DAILY
Qty: 22 G | Refills: 0 | Status: SHIPPED | OUTPATIENT
Start: 2024-11-03 | End: 2024-11-10

## 2024-11-07 ENCOUNTER — APPOINTMENT (OUTPATIENT)
Dept: PRIMARY CARE | Facility: CLINIC | Age: 44
End: 2024-11-07
Payer: COMMERCIAL

## 2024-12-17 ENCOUNTER — APPOINTMENT (OUTPATIENT)
Dept: RHEUMATOLOGY | Facility: CLINIC | Age: 44
End: 2024-12-17
Payer: COMMERCIAL

## 2025-07-02 DIAGNOSIS — M05.9 RHEUMATOID ARTHRITIS WITH POSITIVE RHEUMATOID FACTOR, INVOLVING UNSPECIFIED SITE (MULTI): ICD-10-CM

## 2025-08-19 ASSESSMENT — RHEUMATOLOGY NEW PATIENT QUESTIONNAIRE
DEPRESSION: Y
MORNING STIFFNESS IN LOWER BACK: Y
NUMBNESS OR TINGLING IN HANDS OR FEET: Y
COUGH: N
UNUSUALLY RAPID OR SLOWED HEART RATE: N
UNEXPLAINED WEIGHT CHANGE: N
ABNORMAL URINE: N
SHORTNESS OF BREATH: N
EYE DRYNESS: N
SUN SENSITIVE (SUN ALLERGY): N
EASILY LOSING TEMPER: N
NIGHT SWEATS: N
HEADACHES: N
JOINT PAIN: Y
LOSS OF CONSCIOUSNESS: N
ANXIETY: Y
EXCESSIVE HAIR LOSS (MORE THAN YOUR NORM): N
NODULES/BUMPS: N
ANEMIA: N
PAIN OR BURNING ON URINATION: N
MUSCLE WEAKNESS: Y
DRYNESS OF MOUTH: N
DIFFICULTY STAYING ASLEEP: Y
SWOLLEN LEGS OR FEET: N
FAINTING: N
EYE REDNESS: N
JOINT SWELLING: Y
DOUBLE OR BLURRED VISION: N
BLOOD IN STOOLS: N
SKIN REDNESS: N
INCREASED SUSCEPTIBILITY TO INFECTION: N
UNUSUAL FATIGUE: N
STOMACH PAIN: N
UNEXPLAINED HEARING LOSS: N
JAUNDICE: N
EASY BRUISING: N
UNUSUAL BLEEDING: N
MORNING STIFFNESS: Y
HEARTBURN OR REFLUX: Y
NAUSEA: N
SORES IN MOUTH OR NOSE: N
FEVER: N
DIFFICULTY FALLING ASLEEP: N
HOARSE VOICE: N
MEMORY LOSS: N
LOSS OF VISION: N
COLOR CHANGES OF HANDS OR FEET IN THE COLD: N
RASH: N
HOW WOULD YOU DESCRIBE YOUR STIFFNESS ON AVERAGE: MODERATE
BEHAVIORAL CHANGES: N
SEIZURES: N
SKIN TIGHTNESS: N
EYE PAIN: N
DIFFICULTY BREATHING LYING DOWN: N
VOMITING OF BLOOD OR COFFEE GROUND CONSISTENCY MATERIAL: N
BLACK STOOLS: N
DIFFICULTY SWALLOWING: N
RASH OR ULCERS: N
PERSISTENT DIARRHEA: N
AGITATION: Y
SWOLLEN OR TENDER GLANDS: N
CHEST PAIN: N

## 2025-08-20 ENCOUNTER — APPOINTMENT (OUTPATIENT)
Dept: RHEUMATOLOGY | Facility: CLINIC | Age: 45
End: 2025-08-20
Payer: COMMERCIAL

## 2025-08-20 VITALS
OXYGEN SATURATION: 97 % | HEART RATE: 65 BPM | DIASTOLIC BLOOD PRESSURE: 87 MMHG | WEIGHT: 209.6 LBS | BODY MASS INDEX: 31.04 KG/M2 | HEIGHT: 69 IN | SYSTOLIC BLOOD PRESSURE: 137 MMHG

## 2025-08-20 DIAGNOSIS — M05.79 RHEUMATOID ARTHRITIS INVOLVING MULTIPLE SITES WITH POSITIVE RHEUMATOID FACTOR (MULTI): Primary | ICD-10-CM

## 2025-08-20 DIAGNOSIS — Z11.59 NEED FOR HEPATITIS B SCREENING TEST: ICD-10-CM

## 2025-08-20 DIAGNOSIS — Z79.620 ADALIMUMAB (HUMIRA) LONG-TERM USE: ICD-10-CM

## 2025-08-20 DIAGNOSIS — Z11.59 NEED FOR HEPATITIS C SCREENING TEST: ICD-10-CM

## 2025-08-20 DIAGNOSIS — M05.9 RHEUMATOID ARTHRITIS WITH POSITIVE RHEUMATOID FACTOR, INVOLVING UNSPECIFIED SITE (MULTI): ICD-10-CM

## 2025-08-20 PROCEDURE — 3008F BODY MASS INDEX DOCD: CPT | Performed by: STUDENT IN AN ORGANIZED HEALTH CARE EDUCATION/TRAINING PROGRAM

## 2025-08-20 PROCEDURE — 99215 OFFICE O/P EST HI 40 MIN: CPT | Performed by: STUDENT IN AN ORGANIZED HEALTH CARE EDUCATION/TRAINING PROGRAM

## 2025-08-21 ENCOUNTER — SPECIALTY PHARMACY (OUTPATIENT)
Dept: PHARMACY | Facility: CLINIC | Age: 45
End: 2025-08-21

## 2025-08-22 LAB
ALBUMIN SERPL-MCNC: 4.5 G/DL (ref 3.6–5.1)
ALP SERPL-CCNC: 44 U/L (ref 36–130)
ALT SERPL-CCNC: 24 U/L (ref 9–46)
ANION GAP SERPL CALCULATED.4IONS-SCNC: 8 MMOL/L (CALC) (ref 7–17)
AST SERPL-CCNC: 21 U/L (ref 10–40)
BILIRUB SERPL-MCNC: 0.4 MG/DL (ref 0.2–1.2)
BUN SERPL-MCNC: 16 MG/DL (ref 7–25)
CALCIUM SERPL-MCNC: 9 MG/DL (ref 8.6–10.3)
CCP IGG SERPL-ACNC: >250 UNITS
CHLORIDE SERPL-SCNC: 103 MMOL/L (ref 98–110)
CO2 SERPL-SCNC: 29 MMOL/L (ref 20–32)
CREAT SERPL-MCNC: 1.11 MG/DL (ref 0.6–1.29)
EGFRCR SERPLBLD CKD-EPI 2021: 83 ML/MIN/1.73M2
ERYTHROCYTE [DISTWIDTH] IN BLOOD BY AUTOMATED COUNT: 12.4 % (ref 11–15)
GLUCOSE SERPL-MCNC: 98 MG/DL (ref 65–99)
HBV CORE AB SERPL QL IA: NORMAL
HBV SURFACE AG SERPL QL IA: NORMAL
HCT VFR BLD AUTO: 44 % (ref 38.5–50)
HCV AB SERPL QL IA: NORMAL
HGB BLD-MCNC: 14.6 G/DL (ref 13.2–17.1)
HLA-B27 QL NAA+PROBE: NORMAL
MCH RBC QN AUTO: 31.2 PG (ref 27–33)
MCHC RBC AUTO-ENTMCNC: 33.2 G/DL (ref 32–36)
MCV RBC AUTO: 94 FL (ref 80–100)
PLATELET # BLD AUTO: 196 THOUSAND/UL (ref 140–400)
PMV BLD REES-ECKER: 11.9 FL (ref 7.5–12.5)
POTASSIUM SERPL-SCNC: 4.1 MMOL/L (ref 3.5–5.3)
PROT SERPL-MCNC: 7.5 G/DL (ref 6.1–8.1)
QUEST HLAB27 TYPING RESULTS REVIEWED BY:: NORMAL
RBC # BLD AUTO: 4.68 MILLION/UL (ref 4.2–5.8)
RHEUMATOID FACT SERPL-ACNC: 20 IU/ML
SODIUM SERPL-SCNC: 140 MMOL/L (ref 135–146)
WBC # BLD AUTO: 7.6 THOUSAND/UL (ref 3.8–10.8)

## 2025-08-25 LAB
ALBUMIN SERPL-MCNC: 4.5 G/DL (ref 3.6–5.1)
ALP SERPL-CCNC: 44 U/L (ref 36–130)
ALT SERPL-CCNC: 24 U/L (ref 9–46)
ANION GAP SERPL CALCULATED.4IONS-SCNC: 8 MMOL/L (CALC) (ref 7–17)
AST SERPL-CCNC: 21 U/L (ref 10–40)
BILIRUB SERPL-MCNC: 0.4 MG/DL (ref 0.2–1.2)
BUN SERPL-MCNC: 16 MG/DL (ref 7–25)
CALCIUM SERPL-MCNC: 9 MG/DL (ref 8.6–10.3)
CCP IGG SERPL-ACNC: >250 UNITS
CHLORIDE SERPL-SCNC: 103 MMOL/L (ref 98–110)
CO2 SERPL-SCNC: 29 MMOL/L (ref 20–32)
CREAT SERPL-MCNC: 1.11 MG/DL (ref 0.6–1.29)
EGFRCR SERPLBLD CKD-EPI 2021: 83 ML/MIN/1.73M2
ERYTHROCYTE [DISTWIDTH] IN BLOOD BY AUTOMATED COUNT: 12.4 % (ref 11–15)
GLUCOSE SERPL-MCNC: 98 MG/DL (ref 65–99)
HBV CORE AB SERPL QL IA: NORMAL
HBV SURFACE AG SERPL QL IA: NORMAL
HCT VFR BLD AUTO: 44 % (ref 38.5–50)
HCV AB SERPL QL IA: NORMAL
HGB BLD-MCNC: 14.6 G/DL (ref 13.2–17.1)
HLA-B27 QL NAA+PROBE: NEGATIVE
MCH RBC QN AUTO: 31.2 PG (ref 27–33)
MCHC RBC AUTO-ENTMCNC: 33.2 G/DL (ref 32–36)
MCV RBC AUTO: 94 FL (ref 80–100)
PLATELET # BLD AUTO: 196 THOUSAND/UL (ref 140–400)
PMV BLD REES-ECKER: 11.9 FL (ref 7.5–12.5)
POTASSIUM SERPL-SCNC: 4.1 MMOL/L (ref 3.5–5.3)
PROT SERPL-MCNC: 7.5 G/DL (ref 6.1–8.1)
QUEST HLAB27 TYPING RESULTS REVIEWED BY:: NORMAL
RBC # BLD AUTO: 4.68 MILLION/UL (ref 4.2–5.8)
RHEUMATOID FACT SERPL-ACNC: 20 IU/ML
SODIUM SERPL-SCNC: 140 MMOL/L (ref 135–146)
WBC # BLD AUTO: 7.6 THOUSAND/UL (ref 3.8–10.8)

## 2025-08-28 DIAGNOSIS — M05.9 RHEUMATOID ARTHRITIS WITH POSITIVE RHEUMATOID FACTOR, INVOLVING UNSPECIFIED SITE (MULTI): ICD-10-CM

## 2025-11-25 ENCOUNTER — APPOINTMENT (OUTPATIENT)
Dept: RHEUMATOLOGY | Facility: CLINIC | Age: 45
End: 2025-11-25
Payer: COMMERCIAL